# Patient Record
Sex: FEMALE
[De-identification: names, ages, dates, MRNs, and addresses within clinical notes are randomized per-mention and may not be internally consistent; named-entity substitution may affect disease eponyms.]

---

## 2020-03-18 PROBLEM — Z00.00 ENCOUNTER FOR PREVENTIVE HEALTH EXAMINATION: Status: ACTIVE | Noted: 2020-03-18

## 2020-04-03 ENCOUNTER — APPOINTMENT (OUTPATIENT)
Dept: RADIOLOGY | Facility: HOSPITAL | Age: 65
End: 2020-04-03

## 2020-04-03 ENCOUNTER — APPOINTMENT (OUTPATIENT)
Dept: SPEECH THERAPY | Facility: HOSPITAL | Age: 65
End: 2020-04-03

## 2023-11-28 ENCOUNTER — NON-APPOINTMENT (OUTPATIENT)
Age: 68
End: 2023-11-28

## 2023-11-28 ENCOUNTER — APPOINTMENT (OUTPATIENT)
Dept: CARDIOLOGY | Facility: CLINIC | Age: 68
End: 2023-11-28
Payer: COMMERCIAL

## 2023-11-28 VITALS
OXYGEN SATURATION: 98 % | BODY MASS INDEX: 26.4 KG/M2 | HEIGHT: 63 IN | HEART RATE: 70 BPM | TEMPERATURE: 98 F | WEIGHT: 149 LBS | DIASTOLIC BLOOD PRESSURE: 92 MMHG | SYSTOLIC BLOOD PRESSURE: 135 MMHG

## 2023-11-28 DIAGNOSIS — Z87.891 PERSONAL HISTORY OF NICOTINE DEPENDENCE: ICD-10-CM

## 2023-11-28 PROCEDURE — 93242 EXT ECG>48HR<7D RECORDING: CPT

## 2023-11-28 PROCEDURE — 99205 OFFICE O/P NEW HI 60 MIN: CPT | Mod: 25

## 2023-11-28 PROCEDURE — 93000 ELECTROCARDIOGRAM COMPLETE: CPT

## 2023-11-29 LAB
ALBUMIN SERPL ELPH-MCNC: 4.4 G/DL
ALP BLD-CCNC: 95 U/L
ALT SERPL-CCNC: 11 U/L
ANION GAP SERPL CALC-SCNC: 12 MMOL/L
AST SERPL-CCNC: 15 U/L
BASOPHILS # BLD AUTO: 0.05 K/UL
BASOPHILS NFR BLD AUTO: 0.6 %
BILIRUB SERPL-MCNC: 0.2 MG/DL
BUN SERPL-MCNC: 14 MG/DL
CALCIUM SERPL-MCNC: 9.3 MG/DL
CHLORIDE SERPL-SCNC: 104 MMOL/L
CHOLEST SERPL-MCNC: 223 MG/DL
CO2 SERPL-SCNC: 25 MMOL/L
CREAT SERPL-MCNC: 1.56 MG/DL
EGFR: 36 ML/MIN/1.73M2
EOSINOPHIL # BLD AUTO: 0.12 K/UL
EOSINOPHIL NFR BLD AUTO: 1.4 %
GLUCOSE SERPL-MCNC: 91 MG/DL
HCT VFR BLD CALC: 38.6 %
HDLC SERPL-MCNC: 103 MG/DL
HGB BLD-MCNC: 12.6 G/DL
IMM GRANULOCYTES NFR BLD AUTO: 0.5 %
LDLC SERPL CALC-MCNC: 95 MG/DL
LYMPHOCYTES # BLD AUTO: 2.47 K/UL
LYMPHOCYTES NFR BLD AUTO: 27.8 %
MAN DIFF?: NORMAL
MCHC RBC-ENTMCNC: 30.8 PG
MCHC RBC-ENTMCNC: 32.6 GM/DL
MCV RBC AUTO: 94.4 FL
MONOCYTES # BLD AUTO: 0.6 K/UL
MONOCYTES NFR BLD AUTO: 6.8 %
NEUTROPHILS # BLD AUTO: 5.59 K/UL
NEUTROPHILS NFR BLD AUTO: 62.9 %
NONHDLC SERPL-MCNC: 120 MG/DL
PLATELET # BLD AUTO: 377 K/UL
POTASSIUM SERPL-SCNC: 4.2 MMOL/L
PROT SERPL-MCNC: 6.6 G/DL
RBC # BLD: 4.09 M/UL
RBC # FLD: 12.3 %
SODIUM SERPL-SCNC: 141 MMOL/L
TRIGL SERPL-MCNC: 148 MG/DL
WBC # FLD AUTO: 8.87 K/UL

## 2023-12-05 ENCOUNTER — APPOINTMENT (OUTPATIENT)
Dept: CARDIOLOGY | Facility: CLINIC | Age: 68
End: 2023-12-05
Payer: COMMERCIAL

## 2023-12-05 PROCEDURE — 93306 TTE W/DOPPLER COMPLETE: CPT

## 2023-12-11 ENCOUNTER — APPOINTMENT (OUTPATIENT)
Dept: CARDIOLOGY | Facility: CLINIC | Age: 68
End: 2023-12-11
Payer: COMMERCIAL

## 2023-12-11 VITALS
TEMPERATURE: 97.1 F | SYSTOLIC BLOOD PRESSURE: 145 MMHG | HEART RATE: 62 BPM | DIASTOLIC BLOOD PRESSURE: 77 MMHG | OXYGEN SATURATION: 100 % | BODY MASS INDEX: 26.58 KG/M2 | HEIGHT: 63 IN | WEIGHT: 150 LBS

## 2023-12-11 DIAGNOSIS — I49.1 ATRIAL PREMATURE DEPOLARIZATION: ICD-10-CM

## 2023-12-11 PROCEDURE — 99215 OFFICE O/P EST HI 40 MIN: CPT

## 2024-01-10 ENCOUNTER — APPOINTMENT (OUTPATIENT)
Dept: HEART AND VASCULAR | Facility: CLINIC | Age: 69
End: 2024-01-10
Payer: COMMERCIAL

## 2024-01-10 ENCOUNTER — NON-APPOINTMENT (OUTPATIENT)
Age: 69
End: 2024-01-10

## 2024-01-10 VITALS — DIASTOLIC BLOOD PRESSURE: 80 MMHG | HEART RATE: 62 BPM | SYSTOLIC BLOOD PRESSURE: 126 MMHG

## 2024-01-10 DIAGNOSIS — I51.7 CARDIOMEGALY: ICD-10-CM

## 2024-01-10 DIAGNOSIS — I47.29 OTHER VENTRICULAR TACHYCARDIA: ICD-10-CM

## 2024-01-10 DIAGNOSIS — E78.5 HYPERLIPIDEMIA, UNSPECIFIED: ICD-10-CM

## 2024-01-10 DIAGNOSIS — R94.31 ABNORMAL ELECTROCARDIOGRAM [ECG] [EKG]: ICD-10-CM

## 2024-01-10 PROCEDURE — 93000 ELECTROCARDIOGRAM COMPLETE: CPT

## 2024-01-10 PROCEDURE — 99214 OFFICE O/P EST MOD 30 MIN: CPT | Mod: 25

## 2024-01-10 RX ORDER — ATORVASTATIN CALCIUM 10 MG/1
10 TABLET, FILM COATED ORAL
Refills: 0 | Status: ACTIVE | COMMUNITY

## 2024-01-10 NOTE — DISCUSSION/SUMMARY
[FreeTextEntry1] : EKG:NSR,LVH reviewed STress /echo with her- normal LVEF,LVH,no ischemia- Holer with NSVT- will get MRI given VT  to r/o HCM/RVD- given lo HR will not add BB now continue lipitor for HLD-no rx for apc's

## 2024-08-14 ENCOUNTER — NON-APPOINTMENT (OUTPATIENT)
Age: 69
End: 2024-08-14

## 2024-08-14 ENCOUNTER — APPOINTMENT (OUTPATIENT)
Dept: HEART AND VASCULAR | Facility: CLINIC | Age: 69
End: 2024-08-14
Payer: MEDICARE

## 2024-08-14 VITALS
SYSTOLIC BLOOD PRESSURE: 114 MMHG | TEMPERATURE: 98 F | WEIGHT: 135 LBS | OXYGEN SATURATION: 100 % | HEART RATE: 66 BPM | HEIGHT: 63 IN | BODY MASS INDEX: 23.92 KG/M2 | DIASTOLIC BLOOD PRESSURE: 43 MMHG

## 2024-08-14 DIAGNOSIS — E78.5 HYPERLIPIDEMIA, UNSPECIFIED: ICD-10-CM

## 2024-08-14 DIAGNOSIS — R00.2 PALPITATIONS: ICD-10-CM

## 2024-08-14 DIAGNOSIS — I47.29 OTHER VENTRICULAR TACHYCARDIA: ICD-10-CM

## 2024-08-14 PROCEDURE — 99204 OFFICE O/P NEW MOD 45 MIN: CPT

## 2024-08-14 PROCEDURE — G2211 COMPLEX E/M VISIT ADD ON: CPT

## 2024-08-14 PROCEDURE — 93000 ELECTROCARDIOGRAM COMPLETE: CPT

## 2024-08-14 RX ORDER — METOPROLOL SUCCINATE 25 MG/1
25 TABLET, EXTENDED RELEASE ORAL DAILY
Qty: 1 | Refills: 0 | Status: COMPLETED | COMMUNITY
Start: 2024-08-14 | End: 2024-08-15

## 2024-08-14 NOTE — HISTORY OF PRESENT ILLNESS
[FreeTextEntry1] : occasional nocturnal palpitations- no cp/sob/lightheadedness didn't get MRI- has a lot of jorge l

## 2024-08-14 NOTE — DISCUSSION/SUMMARY
[FreeTextEntry1] : EKG:NSR would get MRI for VT and start low dose BB for palpitations continue lipitor for HLD Her cardaic status is stable enough to permit upper KARINA endoscopy

## 2025-01-08 ENCOUNTER — APPOINTMENT (OUTPATIENT)
Dept: HEART AND VASCULAR | Facility: CLINIC | Age: 70
End: 2025-01-08
Payer: MEDICARE

## 2025-01-08 ENCOUNTER — NON-APPOINTMENT (OUTPATIENT)
Age: 70
End: 2025-01-08

## 2025-01-08 VITALS
WEIGHT: 128 LBS | BODY MASS INDEX: 22.68 KG/M2 | HEART RATE: 70 BPM | DIASTOLIC BLOOD PRESSURE: 73 MMHG | SYSTOLIC BLOOD PRESSURE: 132 MMHG | OXYGEN SATURATION: 99 % | TEMPERATURE: 98 F | HEIGHT: 63 IN

## 2025-01-08 DIAGNOSIS — R00.2 PALPITATIONS: ICD-10-CM

## 2025-01-08 DIAGNOSIS — E78.5 HYPERLIPIDEMIA, UNSPECIFIED: ICD-10-CM

## 2025-01-08 DIAGNOSIS — I47.29 OTHER VENTRICULAR TACHYCARDIA: ICD-10-CM

## 2025-01-08 PROCEDURE — G2211 COMPLEX E/M VISIT ADD ON: CPT

## 2025-01-08 PROCEDURE — 99214 OFFICE O/P EST MOD 30 MIN: CPT

## 2025-01-08 PROCEDURE — 93000 ELECTROCARDIOGRAM COMPLETE: CPT

## 2025-01-10 ENCOUNTER — APPOINTMENT (OUTPATIENT)
Dept: CARDIOLOGY | Facility: CLINIC | Age: 70
End: 2025-01-10

## 2025-01-10 PROCEDURE — 93244 EXT ECG>48HR<7D REV&INTERPJ: CPT

## 2025-01-24 ENCOUNTER — TRANSCRIPTION ENCOUNTER (OUTPATIENT)
Age: 70
End: 2025-01-24

## 2025-01-27 RX ORDER — OMEPRAZOLE 20 MG/1
20 CAPSULE, DELAYED RELEASE ORAL
Refills: 0 | Status: ACTIVE | COMMUNITY
Start: 2025-01-27

## 2025-01-27 RX ORDER — METHIMAZOLE 5 MG/1
5 TABLET ORAL
Refills: 0 | Status: ACTIVE | COMMUNITY
Start: 2025-01-27

## 2025-01-27 RX ORDER — BUPROPION HYDROCHLORIDE 300 MG/1
300 TABLET, EXTENDED RELEASE ORAL
Refills: 0 | Status: ACTIVE | COMMUNITY
Start: 2025-01-27

## 2025-05-20 ENCOUNTER — APPOINTMENT (OUTPATIENT)
Dept: CARDIOLOGY | Facility: CLINIC | Age: 70
End: 2025-05-20
Payer: MEDICARE

## 2025-05-20 DIAGNOSIS — I34.0 NONRHEUMATIC MITRAL (VALVE) INSUFFICIENCY: ICD-10-CM

## 2025-05-20 PROCEDURE — 93306 TTE W/DOPPLER COMPLETE: CPT

## 2025-05-21 ENCOUNTER — APPOINTMENT (OUTPATIENT)
Dept: HEART AND VASCULAR | Facility: CLINIC | Age: 70
End: 2025-05-21
Payer: MEDICARE

## 2025-05-21 ENCOUNTER — NON-APPOINTMENT (OUTPATIENT)
Age: 70
End: 2025-05-21

## 2025-05-21 VITALS
HEART RATE: 66 BPM | HEIGHT: 63 IN | WEIGHT: 127 LBS | TEMPERATURE: 98 F | SYSTOLIC BLOOD PRESSURE: 155 MMHG | BODY MASS INDEX: 22.5 KG/M2 | DIASTOLIC BLOOD PRESSURE: 81 MMHG | OXYGEN SATURATION: 99 %

## 2025-05-21 DIAGNOSIS — E78.5 HYPERLIPIDEMIA, UNSPECIFIED: ICD-10-CM

## 2025-05-21 DIAGNOSIS — R00.2 PALPITATIONS: ICD-10-CM

## 2025-05-21 DIAGNOSIS — I47.29 OTHER VENTRICULAR TACHYCARDIA: ICD-10-CM

## 2025-05-21 DIAGNOSIS — R94.31 ABNORMAL ELECTROCARDIOGRAM [ECG] [EKG]: ICD-10-CM

## 2025-05-21 PROBLEM — I34.0 NON-RHEUMATIC MITRAL REGURGITATION: Status: ACTIVE | Noted: 2025-05-21

## 2025-05-21 PROCEDURE — 93000 ELECTROCARDIOGRAM COMPLETE: CPT

## 2025-05-21 PROCEDURE — 99214 OFFICE O/P EST MOD 30 MIN: CPT

## 2025-05-21 PROCEDURE — G2211 COMPLEX E/M VISIT ADD ON: CPT
